# Patient Record
(demographics unavailable — no encounter records)

---

## 2017-03-31 NOTE — PDOC
History of Present Illness





- General


History Source: Patient


Exam Limitations: No Limitations





- History of Present Illness


Initial Comments: 


17 23:58


The patient is a 43 year old, Malaysian speaking, female  who is 6 weeks 

pregnant, with no significant past medical history, presenting to the Emergency 

Department with vaginal bleeding. The patient reports that she was having sex 

with her boyfriend earlier tonight when she noticed vaginal bleeding. She 

admits that the bleeding was minimal and describes the blood as red. She admits 

that the bleeding has stopped since she arrived to the ED. She reports that her 

most recent ultrasound was last week, when she was reported as 6 weeks and 2 

days pregnant. Her next ultrasound is scheduled for Tuesday. She is being 

followed by an OBGYN and has prenatal care. 





The patient denies abdominal pain, or vaginal pain. Patient denies dysuria, 

urinary frequency, and hematuria. Patient denies dizziness, or lightheadedness. 

Patient denies nausea, vomiting, and diarrhea. 





<Nany Sadler - Last Filed: 17 23:58>





<Sara Martin - Last Filed: 17 05:02>





- General


Stated Complaint: 6 WEEKS PREGNANT/BLEEDING


Time Seen by Provider: 17 23:11





Past History





<Nany Sadler - Last Filed: 17 23:58>





- Psycho/Social/Smoking Cessation Hx


Anxiety: No


Suicidal Ideation: No


Smoking Status: No


Smoking History: Never smoked


Number of Cigarettes Smoked Daily: 0


Hx Alcohol Use: No


Substance Use Type: None





<Sara Martin - Last Filed: 17 05:02>





- Past Medical History


Allergies/Adverse Reactions: 


 Allergies











Allergy/AdvReac Type Severity Reaction Status Date / Time


 


No Known Drug Allergies Allergy   Verified 17 23:46


 


SEAFOOD Allergy  Difficulty Uncoded 17 23:24





   Breathing  











Home Medications: 


Ambulatory Orders





NK [No Known Home Medication]  17 











**Review of Systems





- Review of Systems


Able to Perform ROS?: Yes


Comments:: 


17 23:58


GENERAL/CONSTITUTIONAL: No fever or chills. No weakness.


HEAD, EYES, EARS, NOSE AND THROAT: No change in vision. No ear pain or 

discharge. No sore throat.


CARDIOVASCULAR: No chest pain or shortness of breath.


RESPIRATORY: No cough, wheezing, or hemoptysis.


GASTROINTESTINAL: No nausea, vomiting, diarrhea or constipation.


GENITOURINARY: + vaginal bleeding. No dysuria, frequency, or change in 

urination.


MUSCULOSKELETAL: No joint or muscle swelling or pain. No neck or back pain.


SKIN: No rash


NEUROLOGIC: No headache, vertigo, loss of consciousness, or change in strength/

sensation.


ENDOCRINE: No increased thirst. No abnormal weight change.


HEMATOLOGIC/LYMPHATIC: No anemia, easy bleeding, or history of blood clots.


ALLERGIC/IMMUNOLOGIC: No hives or skin allergy.





<Nany Sadler - Last Filed: 17 23:58>





*Physical Exam





- Vital Signs


 Last Vital Signs











Temp Pulse Resp BP Pulse Ox


 


 97.1 F L  99 H  20   126/84   99 


 


 17 23:24  17 23:24  17 23:24  17 23:24  17 23:24














- Physical Exam


Comments: 


17 23:59


GENERAL: Awake, alert, and fully oriented, in no acute distress


HEAD: No signs of trauma


EYES: PERRLA, EOMI, sclera anicteric, conjunctiva clear


ENT: Auricles normal inspection, hearing grossly normal, nares patent, 

oropharynx clear without exudates. Moist mucosa


NECK: Normal ROM, supple, no lymphadenopathy, JVD, or masses


LUNGS: Breath sounds equal, clear to auscultation bilaterally.  No wheezes, and 

no crackles


HEART: Regular rate and rhythm, normal S1 and S2, no murmurs, rubs or gallops


ABDOMEN: Soft, nontender, normoactive bowel sounds.  No guarding, no rebound.  

No masses


EXTREMITIES: Normal range of motion, no edema.  No clubbing or cyanosis. No 

cords, erythema, or tenderness


NEUROLOGICAL: Cranial nerves II through XII grossly intact.  Normal speech, 

normal gait


SKIN: Warm, Dry, normal turgor, no rashes or lesions noted.





<Nany Sadler - Last Filed: 17 23:58>





ED Treatment Course





- LABORATORY


CBC & Chemistry Diagram: 


 17 00:01





 17 00:01





<Sara Martin - Last Filed: 17 05:02>





Medical Decision Making





- Medical Decision Making


17 23:54


Pt is approx 6-7 weeks pregnant. Last week at her GYN, ultrasound showed a 6 

week 2 day fetus. Tonight after sex, pt realized she was spotting and she is 

worried. SHe has no pain and no dysuria. She doesn't know her blood type. She 

has a normal exam. She will have her labs checked in the ER. I will not send 

her for US, as she has her next appointment on this TUesday, and she will have 

another ultrasound at that time. 





17 05:01


Rh positive





<Sara Martin - Last Filed: 17 05:02>





*DC/Admit/Observation/Transfer





- Attestations


Scribe Attestion: 


17 23:59


Documentation prepared by Nany Sadler, acting as medical scribe for Sara Martin MD.





<Nany Sadler - Last Filed: 17 23:58>





- Discharge Dispostion


Admit: No





<Sara Martin - Last Filed: 17 05:02>


Diagnosis at time of Disposition: 


 Threatened 





- Discharge Dispostion


Disposition: HOME


Condition at time of disposition: Stable





- Referrals


Referrals: 


Oswaldo Betts MD [Primary Care Provider] - 





- Patient Instructions


Printed Discharge Instructions:  DI for Threatened 





- Post Discharge Activity


Work/School Note:  Back to Work

## 2017-11-14 NOTE — HP
Past Medical History





- Admission


Chief Complaint: Here for labor induction.


History of Present Illness: 





44 y/o  with SIUP at 40.1 weeks gestation here for labor induction.  

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 

has been on suppression since 10/20/17.  NO current active lesions.  Pt also 

AMA.  Elevated 1'GTT elevated, 3'GTT WNL.  Followed with MFM throughout 

pregnancy.  EDC 17.  h/o  in 


History Source: Patient, Medical Record


Limitations to Obtaining History: No Limitations





- Past Medical History


Cardiovascular: No: AFIB, HTN


Pulmonary: No: Asthma, COPD


Gastrointestinal: No: GERD, Ulcerative Colitis


Hepatobiliary: No: Hepatitis B, Hepatitis C


Renal/: No: UTI


...: 2


...Para: 1


...Term: 1


...: 0


...Spon : 0


...Induced : 0


...Multiple Gestation: 0


...LMP: 17


... Weeks Gestation by Dates: 38.1


...EDC by Dates: 17


...EDC by Sono: 17


Heme/Onc: No: Anemia, Sickle Cell Disease


Infectious Disease: Yes: STD's (HSV 2).  No: HIV


Psych: No: Bipolar, Depression


Endocrine: No: Diabetes Mellitus, Hyperthyroidism





- Past Surgical History


Past Surgical History: Yes: None


Hx Myomectomy: No


Hx Transabdominal Cerclage: No





- Smoking History


Smoking history: Never smoked


Have you smoked in the past 12 months: No


Aproximately how many cigarettes per day: 0





- Alcohol/Substance Use


Hx Alcohol Use: No





- Social History


Usual Living Arrangement: Yes: With Spouse


History of Recent Travel: No





Home Medications





- Allergies


Allergies/Adverse Reactions: 


 Allergies











Allergy/AdvReac Type Severity Reaction Status Date / Time


 


No Known Drug Allergies Allergy   Verified 17 18:36


 


SEAFOOD Allergy  Difficulty Uncoded 17 18:36





   Breathing  














- Home Medications


Home Medications: 


Ambulatory Orders





Ferrous Gluconate [Iron] 256 mg PO DAILY 17 


Prenatal Vit No.130/Iron/FA [Prenatal Vitamins] 1 each PO DAILY 17 


Valacyclovir HCl [Valtrex -] 500 mg PO BID 17 











Review of Systems





- Review of Systems


Constitutional: reports: No Symptoms


Eyes: reports: No Symptoms


HENT: reports: No Symptoms


Neck: reports: No Symptoms


Cardiovascular: reports: No Symptoms


Respiratory: reports: No Symptoms


Gastrointestinal: reports: No Symptoms


Genitourinary: reports: No Symptoms


Breasts: reports: No Symptoms Reported


Musculoskeletal: reports: No Symptoms


Integumentary: reports: No Symptoms


Neurological: reports: No Symptoms


Endocrine: reports: No Symptoms


Hematology/Lymphatic: reports: No Symptoms


Psychiatric: reports: No Symptoms





Physical Exam - Maternity


Vital Signs: 


 Vital Signs











Temperature  98.3 F   17 18:41


 


Pulse Rate  104 H  17 18:41


 


Respiratory Rate  20   17 18:41


 


Blood Pressure  108/60   17 18:41


 


O2 Sat by Pulse Oximetry (%)      











Constitutional: Yes: Well Nourished, No Distress, Calm


Eyes: Yes: Conjunctiva Clear, EOM Intact


HENT: Yes: Atraumatic, Normocephalic


Neck: Yes: Supple, Trachea Midline


Cardiovascular: Yes: Regular Rate and Rhythm


Lungs: Clear to auscultation





- Abdominal Exam/OB


Fundal Height: 40


Number of Fetuses: Single


Fetal Presentation: Vertex


Contractions: No


Fetal Monitor Mode: External


Category: I


Accelerations: Uniform


Decelerations: None





- Vaginal Exam/OB


Vaginal Bleediing: No


Dilatation (cm): 0


Effacement (%): 0


Amniotic Membrane Status: Intact


Fetal Presentation: Vertex/Position


Fetal Station: -4





- Physical Exam


Psychiatric: Yes: Alert, Oriented





Hemorrhage Risk Assessment





- Risk Factors


Medium Risk Factors: Yes: None


High Risk Factors: Yes: None


Risk Score: 1


Risk Level: Medium Risk





Problem List





- Problems


(1) Advanced maternal age (AMA), 40 years or greater


Code(s): MXL2253 -    





(2) Term pregnancy


Code(s): Z34.80 - ENCOUNTER FOR SUPRVSN OF NORMAL PREGNANCY, UNSP TRIMESTER   





(3) HSV-2 infection complicating pregnancy


Code(s): O98.519 - OTHER VIRAL DISEASES COMPLICATING PREGNANCY, UNSP TRIMESTER; 

B00.9 - HERPESVIRAL INFECTION, UNSPECIFIED   





Assessment/Plan





44 y/o  with SIUP at 40.1 weeks, IOL


- FHTS cat 1


- IOL, s/p cervidil placement at 1945, re evaluate at 7:45 am 


- AMA


- HSV 2 - on valtrex suppression, no active lesions


- GBS positive, for ampicillin when in labor

## 2017-11-15 NOTE — PN
Ante-Partal Exam





- Subjective


Subjective: 





Pt with mild contractions cervidil out at 755 am


Vital Signs: 


 Vital Signs











Temperature  98.2 F   11/15/17 07:00


 


Pulse Rate  72   11/15/17 07:00


 


Respiratory Rate  18   11/15/17 07:00


 


Blood Pressure  107/58   11/15/17 07:00


 


O2 Sat by Pulse Oximetry (%)      











Bleeding: No


Headache: No


Visual changes: No


Right upper quadrant pain: No





- Contractions


Contractions: Yes


Regularity: Irregular


Intensity: Mild


Fetal Monitor Mode: External





- Exam during Labor


Variability: Moderate


Category: I


Fetal Monitor Accelerations: Present


Fetal Monitor Decelerations: None


Exam: Vaginal


Dilatation (cm): 1


Effacement (%): 50


Amniotic Membrane Status: Intact


Fetal Presentation: Vertex


Fetal Station: -2





- Assessment/Plan


Assessment/Plan: 





Elderly multigravida


40 week


Cat1


SP Cervidil


Hx of HSV on valtrex





Plan


Start Pitocin this morn


stadol prn

## 2017-11-15 NOTE — PN
Ante-Partal Exam





- Subjective


Subjective: 





Pt feeling some pain with contractions. 


Vital Signs: 


 Vital Signs











Temperature  97.8 F   11/15/17 18:00


 


Pulse Rate  83   11/15/17 18:00


 


Respiratory Rate  18   11/15/17 18:00


 


Blood Pressure  110/67   11/15/17 18:00


 


O2 Sat by Pulse Oximetry (%)      











Bleeding: No


Headache: No


Visual changes: No


Right upper quadrant pain: No


Pain (scale 1-10): 3





- Contractions


Contractions: Yes


Regularity: Regular


Intensity: Mild/Mod


Fetal Monitor Mode: External





- Exam during Labor


Fetal Heart Rate: 140


Variability: Moderate


Category: I


Fetal Monitor Accelerations: Present


Fetal Monitor Decelerations: None


Exam: Vaginal


Dilatation (cm): 1


Effacement (%): 0


Amniotic Membrane Status: Intact


Fetal Presentation: Vertex


Fetal Station: -4





- Intrapartum Hemorrhage Risk


Medium Risk Factors: None


High Risk Factors: None


Risk Score: 0


Risk Level: Low Risk





- Assessment/Plan


Assessment/Plan: 





44 y/o with SIUP at 40.2 weeks, IOL


- FHTS cat 1


- IOL, s/p cervidil now on pitocin.  Little cervical change.  Will keep on 

pitocin overnight, if no change by a.m. will try cervidil again


- GBS positive, for ampicillin once active or ROM

## 2017-11-16 NOTE — OP
Operative Note





- Note:


Operative Date: 17


Pre-Operative Diagnosis: failed IOL, SIUP at 40.4 weeks


Operation: primary low transverse  delivery


Findings: 





large globular uterus


normal b/l tubes and ovaries


Post-Operative Diagnosis: Same as Pre-op


Surgeon: Madison Ramos


Assistant: Mak Ball


Anesthesiologist/CRNA: Steven Richmond


Anesthesia: Spinal


Specimens Removed: placenta, fetal cord blood sample


Estimated Blood Loss (mls): 600


Operative Report Dictated: Yes

## 2017-11-16 NOTE — PN
Ante-Partal Exam





- Subjective


Subjective: 





Pt sp Pitocin & cervidil with no progress


Vital Signs: 


 Vital Signs











Temperature  98.1 F   11/16/17 10:00


 


Pulse Rate  93 H  11/16/17 10:00


 


Respiratory Rate  18   11/16/17 10:00


 


Blood Pressure  136/59   11/16/17 10:00


 


O2 Sat by Pulse Oximetry (%)      











Bleeding: No


Headache: No


Visual changes: No


Right upper quadrant pain: No





- Contractions


Contractions: Yes


Regularity: Irregular


Fetal Monitor Mode: External





- Exam during Labor


Variability: Moderate


Category: I


Fetal Monitor Decelerations: None


Exam: Vaginal (head is oblique)


Dilatation (cm): 1


Effacement (%): 50


Amniotic Membrane Status: Intact


Fetal Presentation: Vertex (oblique)


Fetal Station: -3





- Assessment/Plan


Assessment/Plan: 





iup at 40 week


AMA


failed induction





Plan


WIll do CS due to failed induction


notify Peds


notify anesthesia

## 2017-11-17 NOTE — PN
Progress Note (short form)





- Note


Progress Note: 





ANESTHESIOLOGY POST OP NOTE





S: S/P C SECTION UNDER SPINAL ANESTHESIA WITH DURAMORPH FOR POST OP PAIN 

CONTROL. PATIENT REPORTS PAIN CONTROLLED, NO OTHER ADVERSE EFFECT OF ANESTHETIC





O: NEUROLOGICALLY IN TACT





 Last Vital Signs











Temp Pulse Resp BP Pulse Ox


 


 98.8 F   88   20   112/56   99 


 


 11/17/17 05:30  11/17/17 05:30  11/17/17 06:21  11/17/17 05:30  11/16/17 19:30














A/P: NO ADVERSE EFFECT, PAIN CONTROLLED, DEPT OF ANESTHESIA WILL SIGN OFF CASE 

AT THIS TIME

## 2017-11-17 NOTE — PN
Post Partum Progress Note





- Subjective


Subjective: 





Pt doing well this a.m.  Pain controlled, tolerating diet.  Not yet ambulating.

  Joseph catheter with clear yellow urine.  No CP/SOB/F/C/HA or other 

complaints. 


Type of Delivery: Primary C/S


Vital Signs: 


 Vital Signs











Temperature  98.8 F   17 05:30


 


Pulse Rate  88   17 05:30


 


Respiratory Rate  20   17 06:21


 


Blood Pressure  112/56   17 05:30


 


O2 Sat by Pulse Oximetry (%)  99   17 19:30











Uterus: Yes: Fundus @ umbilicus


Incision: Yes: Dressing dry and intact


Abdomen/GI: Yes: Abdomen soft, Passing flatus, Tolerating PO.  No: Abdominal 

Distention, Tender


Lochia: Yes: Rubra


Lochia, amount: Small


Extremities: Yes: Calves non-tender.  No: Edema


Perineum: Yes: Intact


Activity: Ambulating





- Labs


Labs: 


 CBC











WBC  11.0 K/mm3 (4.0-10.0)  H D 17  06:30    


 


RBC  3.96 M/mm3 (3.60-5.2)   17  06:30    


 


Hgb  11.7 GM/dL (10.7-15.3)   17  06:30    


 


Hct  36.0 % (32.4-45.2)   17  06:30    


 


MCV  90.9 fl (80-96)   17  06:30    


 


MCH  29.4 pg (25.7-33.7)   17  06:30    


 


MCHC  32.4 g/dl (32.0-36.0)   17  06:30    


 


RDW  17.2 % (11.6-15.6)  H  17  06:30    


 


Plt Count  104 K/MM3 (134-434)  L D 17  06:30    


 


MPV  9.9 fl (7.5-11.1)   17  06:30    


 


Neutrophils %  83.0 % (42.8-82.8)  H  17  06:30    


 


Lymphocytes %  10.0 % (8-40)  D 17  06:30    


 


Monocytes %  6.4 % (3.8-10.2)   17  06:30    


 


Eosinophils %  0.3 % (0-4.5)   17  06:30    


 


Basophils %  0.3 % (0-2.0)   17  06:30    














Problem List





- Problems


(1) Advanced maternal age (AMA), 40 years or greater


Code(s): LMV8592 -    





(2) Term pregnancy


Code(s): Z34.80 - ENCOUNTER FOR SUPRVSN OF NORMAL PREGNANCY, UNSP TRIMESTER   





(3) HSV-2 infection complicating pregnancy


Code(s): O98.519 - OTHER VIRAL DISEASES COMPLICATING PREGNANCY, UNSP TRIMESTER; 

B00.9 - HERPESVIRAL INFECTION, UNSPECIFIED   





(4)  delivery delivered


Code(s): O82 - ENCOUNTER FOR  DELIVERY WITHOUT INDICATION   





Assessment/Plan





44 y/o POD#1 s/p primary  delivery for failed induction of labor


- AFVSS


- Hgb 11.7 post op 


- advance diet as tolerated


- encourage ambulation


- routine care

## 2017-11-18 NOTE — PN
Post Partum Progress Note





- Subjective


Subjective: 





44 yo Para 3 status post repeat , seen and evaluated.


Doing well, no complaints.


Post Partum Day: 2


Type of Delivery: Primary C/S


Vital Signs: 


 Vital Signs











Temperature  99.1 F   17 08:10


 


Pulse Rate  96 H  17 08:10


 


Respiratory Rate  20   17 08:10


 


Blood Pressure  119/68   17 08:10


 


O2 Sat by Pulse Oximetry (%)  99   17 19:30











Breast Exam: Yes: Soft


Incision: Yes: Dressing dry and intact


Abdomen/GI: Yes: Abdomen soft, Tolerating PO


Lochia: Yes: Rubra


Lochia, amount: Small


Extremities: Yes: Calves non-tender


Perineum: Yes: Intact


Activity: Ambulating





- Labs


Labs: 


 CBC











WBC  11.0 K/mm3 (4.0-10.0)  H D 17  06:30    


 


RBC  3.96 M/mm3 (3.60-5.2)   17  06:30    


 


Hgb  11.7 GM/dL (10.7-15.3)   17  06:30    


 


Hct  36.0 % (32.4-45.2)   17  06:30    


 


MCV  90.9 fl (80-96)   17  06:30    


 


MCH  29.4 pg (25.7-33.7)   17  06:30    


 


MCHC  32.4 g/dl (32.0-36.0)   17  06:30    


 


RDW  17.2 % (11.6-15.6)  H  17  06:30    


 


Plt Count  104 K/MM3 (134-434)  L D 17  06:30    


 


MPV  9.9 fl (7.5-11.1)   17  06:30    


 


Neutrophils %  83.0 % (42.8-82.8)  H  17  06:30    


 


Lymphocytes %  10.0 % (8-40)  D 17  06:30    


 


Monocytes %  6.4 % (3.8-10.2)   17  06:30    


 


Eosinophils %  0.3 % (0-4.5)   17  06:30    


 


Basophils %  0.3 % (0-2.0)   17  06:30    














Assessment/Plan





Status post repeat 


Stable


Continue routine post op care

## 2017-11-19 NOTE — DS
Physical Exam-GYN


Vital Signs: 


 Vital Signs











Temperature  98.8 F   17 22:00


 


Pulse Rate  93 H  17 22:00


 


Respiratory Rate  18   17 22:00


 


Blood Pressure  118/76   17 22:00


 


O2 Sat by Pulse Oximetry (%)  99   17 19:30











Constitutional: Yes: Well Nourished


Eyes: Yes: Conjunctiva Clear


HENT: Yes: Atraumatic


Neck: Yes: Supple


Cardiovascular: Yes: Regular Rate and Rhythm


Respiratory: Yes: Regular, CTA Bilaterally


Gastrointestinal: Yes: Normal Bowel Sounds


Vaginal Exam: Yes: Normal


Cervix: Yes: Normal


Uterus: Yes: Firm


....Post Partum: Yes: Uterus firm, Moderate lochia serosa


Breast(s): Yes: WNL


Neurological: Yes: Alert, Oriented


...Motor Strength: WNL


Psychiatric: Yes: Alert, Oriented


Labs: 


 CBC, BMP





 17 06:30 





 17 17:15 











Delivery





- Delivery


Type of Anesthesia: Spinal


Episiotomy/Laceration: None


EBL (cc): 600





Delivery, Single Birth





- Stages of Labor


Date of Delivery: 17


Time of Delivery: 17:43


Time Placenta Delivered: 17:44





- Condition of Infant


Pediatrician/Neonatologist Present: Yes


Name: Ira Bolanos


Infant Gender: Male


Birth Weight: 8 lb 4 oz


Position: Left, OA


Total Hours ROM (Hrs/Mins): 0/2





- Apgar


  ** 1 Minute


Apgar Total Score: 9





  ** 5 Minutes


Apgar Total Score: 9





-  Feeding Plan


Initial Plan: Exclusive breastfeeding throughout hospitalization





Discharge Summary


Reason For Visit: LABOR INDUCTION


Current Active Problems





Advanced maternal age (AMA), 40 years or greater (Acute)


 delivery delivered (Acute)


HSV-2 infection complicating pregnancy (Acute)


Term pregnancy (Acute)








Procedures: Principal: Spontaneous vaginal delivery


Hospital Course: 





Routine postpartum care


Condition: Good





- Instructions


Diet, Activity, Other Instructions: 


Regular diet


No driving, no lifting x 4 weeks


F/U with MD in one week


Referrals: 


Madison Ramos DO [Staff Physician] - 


Disposition: HOME





- Home Medications


Comprehensive Discharge Medication List: 


Ambulatory Orders





Ferrous Gluconate [Iron] 256 mg PO DAILY 17 


Prenatal Vit No.130/Iron/FA [Prenatal Vitamins] 1 each PO DAILY 17 


Valacyclovir HCl [Valtrex -] 500 mg PO BID 17
